# Patient Record
(demographics unavailable — no encounter records)

---
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
## 2024-11-13 NOTE — PHYSICAL EXAM
[General Appearance - Alert] : alert [General Appearance - In No Acute Distress] : in no acute distress [Clean] : clean [Dry] : dry [Healing Well] : healing well [Oriented To Time, Place, And Person] : oriented to person, place, and time [Impaired Insight] : insight and judgment were intact [Affect] : the affect was normal [Over the Past 2 Weeks, Have You Felt Down, Depressed, or Hopeless?] : 1.) Over the past 2 weeks, have you felt down, depressed, or hopeless? No [Over the Past 2 Weeks, Have You Felt Little Interest or Pleasure Doing Things?] : 2.) Over the past 2 weeks, have you felt little interest or pleasure doing things? No [FreeTextEntry1] : -depression screening/MDD completed; PH9 score  < 5, negative MDD [Person] : oriented to person [Place] : oriented to place [Time] : oriented to time [Short Term Intact] : short term memory intact [Remote Intact] : remote memory intact [Span Intact] : the attention span was normal [Concentration Intact] : normal concentrating ability [Fluency] : fluency intact [Comprehension] : comprehension intact [Current Events] : adequate knowledge of current events [Past History] : adequate knowledge of personal past history [Vocabulary] : adequate range of vocabulary [Cranial Nerves Optic (II)] : visual acuity intact bilaterally,  pupils equal round and reactive to light [Cranial Nerves Oculomotor (III)] : extraocular motion intact [Cranial Nerves Trigeminal (V)] : facial sensation intact symmetrically [Cranial Nerves Facial (VII)] : face symmetrical [Cranial Nerves Vestibulocochlear (VIII)] : hearing was intact bilaterally [Cranial Nerves Glossopharyngeal (IX)] : tongue and palate midline [Cranial Nerves Accessory (XI - Cranial And Spinal)] : head turning and shoulder shrug symmetric [Cranial Nerves Hypoglossal (XII)] : there was no tongue deviation with protrusion [Motor Tone] : muscle tone was normal in all four extremities [Motor Strength] : muscle strength was normal in all four extremities [No Muscle Atrophy] : normal bulk in all four extremities [Sensation Tactile Decrease] : light touch was intact [Sclera] : the sclera and conjunctiva were normal [PERRL With Normal Accommodation] : pupils were equal in size, round, reactive to light, with normal accommodation [Extraocular Movements] : extraocular movements were intact

## 2024-11-30 NOTE — REASON FOR VISIT
[de-identified] : L cranioplasty [de-identified] : 10/28/24 [de-identified] : 29 [de-identified] : clear PMMA
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
## 2024-11-30 NOTE — REASON FOR VISIT
[de-identified] : L cranioplasty [de-identified] : 10/28/24 [de-identified] : 29 [de-identified] : clear PMMA
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
## 2024-11-30 NOTE — DATA REVIEWED
[de-identified] :  ACC: [id number] EXAM: CT BRAIN ORDERED BY: RASHIDA CEBALLOS  PROCEDURE DATE: 08/13/2024    INTERPRETATION: CLINICAL INFORMATION: monitor SDH  COMPARISON: 7/31/2024  TECHNIQUE: Serial axial images were obtained from the skull base to the vertex using multi-slice helical technique. Sagittal and coronal reformats were obtained.  FINDINGS:  VENTRICLES AND SULCI: There is now ex vacuo dilatation of the left lateral ventricle when compared with the prior. INTRA-AXIAL: Left temporal lucency with minimal residual associated high attenuation decreased in conspicuity compared with the prior EXTRA-AXIAL: Extra-axial subdural hematoma with some acute hemorrhage as well as more chronic appearing hemorrhage/hygroma in the left subdural region at the site of the craniectomy. VISUALIZED SINUSES: Better a ration of the left sphenoid sinus with resolution of sphenoid sinus opacification. Mild right sphenoid sinus mucosal disease TYMPANOMASTOID CAVITIES: Clear. VISUALIZED ORBITS: Bilateral cataract surgery CALVARIUM: Evidence of left hemicraniectomy. Decreased soft tissue swelling extradural at the site of the craniectomy compared with the prior.  MISCELLANEOUS: None.   IMPRESSION: Status post left hemicraniectomy. Residual subdural hemorrhage and fluid at the site of the craniectomy relatively stable in appearance compared with the prior. Decreased extradural soft tissue swelling compared with the prior at the site of the craniectomy. Left temporal lucency with associated high attenuation decreased in conspicuity compared with the prior.
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
## 2024-11-30 NOTE — REASON FOR VISIT
[de-identified] : L cranioplasty [de-identified] : 10/28/24 [de-identified] : 29 [de-identified] : clear PMMA
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
## 2024-11-30 NOTE — DATA REVIEWED
[de-identified] :  ACC: [id number] EXAM: CT BRAIN ORDERED BY: RASHIDA CEBALLOS  PROCEDURE DATE: 08/13/2024    INTERPRETATION: CLINICAL INFORMATION: monitor SDH  COMPARISON: 7/31/2024  TECHNIQUE: Serial axial images were obtained from the skull base to the vertex using multi-slice helical technique. Sagittal and coronal reformats were obtained.  FINDINGS:  VENTRICLES AND SULCI: There is now ex vacuo dilatation of the left lateral ventricle when compared with the prior. INTRA-AXIAL: Left temporal lucency with minimal residual associated high attenuation decreased in conspicuity compared with the prior EXTRA-AXIAL: Extra-axial subdural hematoma with some acute hemorrhage as well as more chronic appearing hemorrhage/hygroma in the left subdural region at the site of the craniectomy. VISUALIZED SINUSES: Better a ration of the left sphenoid sinus with resolution of sphenoid sinus opacification. Mild right sphenoid sinus mucosal disease TYMPANOMASTOID CAVITIES: Clear. VISUALIZED ORBITS: Bilateral cataract surgery CALVARIUM: Evidence of left hemicraniectomy. Decreased soft tissue swelling extradural at the site of the craniectomy compared with the prior.  MISCELLANEOUS: None.   IMPRESSION: Status post left hemicraniectomy. Residual subdural hemorrhage and fluid at the site of the craniectomy relatively stable in appearance compared with the prior. Decreased extradural soft tissue swelling compared with the prior at the site of the craniectomy. Left temporal lucency with associated high attenuation decreased in conspicuity compared with the prior.
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
## 2024-11-30 NOTE — DATA REVIEWED
[de-identified] :  ACC: [id number] EXAM: CT BRAIN ORDERED BY: RASHIDA CEBALLOS  PROCEDURE DATE: 08/13/2024    INTERPRETATION: CLINICAL INFORMATION: monitor SDH  COMPARISON: 7/31/2024  TECHNIQUE: Serial axial images were obtained from the skull base to the vertex using multi-slice helical technique. Sagittal and coronal reformats were obtained.  FINDINGS:  VENTRICLES AND SULCI: There is now ex vacuo dilatation of the left lateral ventricle when compared with the prior. INTRA-AXIAL: Left temporal lucency with minimal residual associated high attenuation decreased in conspicuity compared with the prior EXTRA-AXIAL: Extra-axial subdural hematoma with some acute hemorrhage as well as more chronic appearing hemorrhage/hygroma in the left subdural region at the site of the craniectomy. VISUALIZED SINUSES: Better a ration of the left sphenoid sinus with resolution of sphenoid sinus opacification. Mild right sphenoid sinus mucosal disease TYMPANOMASTOID CAVITIES: Clear. VISUALIZED ORBITS: Bilateral cataract surgery CALVARIUM: Evidence of left hemicraniectomy. Decreased soft tissue swelling extradural at the site of the craniectomy compared with the prior.  MISCELLANEOUS: None.   IMPRESSION: Status post left hemicraniectomy. Residual subdural hemorrhage and fluid at the site of the craniectomy relatively stable in appearance compared with the prior. Decreased extradural soft tissue swelling compared with the prior at the site of the craniectomy. Left temporal lucency with associated high attenuation decreased in conspicuity compared with the prior.

## 2024-11-30 NOTE — PHYSICAL EXAM
[General Appearance - Alert] : alert [General Appearance - In No Acute Distress] : in no acute distress [Clean] : clean [Dry] : dry [Healing Well] : healing well [Sutures Intact] : closed with intact sutures [No Drainage] : without drainage [Normal Skin] : normal [Oriented To Time, Place, And Person] : oriented to person, place, and time [Sclera] : the sclera and conjunctiva were normal [Outer Ear] : the ears and nose were normal in appearance [Neck Appearance] : the appearance of the neck was normal [] : no respiratory distress [Abnormal Walk] : normal gait [Skin Color & Pigmentation] : normal skin color and pigmentation [FreeTextEntry1] : L scalp

## 2025-01-13 NOTE — REASON FOR VISIT
[Initial Eval - Existing Diagnosis] : an initial evaluation of an existing diagnosis [Spouse] : spouse [Family Member] : family member

## 2025-01-15 NOTE — HISTORY OF PRESENT ILLNESS
[FreeTextEntry1] : 62-year-old man with left traumatic subdural hematoma and skull fracture in July 2024 s/p left hemicraniectomy (Dr. Valdivia) and later cranioplasty (Dr. Petit).  He presents today to discuss possibly tapering off his seizure medications as well as symptoms he has been experiencing his hospitalization.  Patient speaks primarily Latvian; daughter was translating throughout appointment per patient request.  They report that he has been taking Keppra 1g BID since his initial hospitalization but that to their knowledge he has never had any seizures (no clinical seizures and they were told EEG during initial hospitalization did not show seizures).  He would like to know if he can try to lower this as he is concerned he may be having side effects from it.  Specifically he has had tingling/electric-type pains in his head and read that Keppra can cause headaches.  Also sometimes feels dizzy upon standing.  Of note he takes metoprolol BID and losartan daily in AM for blood pressure.

## 2025-01-15 NOTE — ASSESSMENT
[FreeTextEntry1] : 62-year-old man with left traumatic subdural hematoma and skull fracture in July 2024 s/p left hemicraniectomy and cranioplasty.  He has had no clinical or electrographic seizures throughout his surgical course and remains on Keppra 1g BID and thus I would recommend starting to taper off.  Of note, I am not certain this will help with the electric-type head sensations (which may be neuropathic pain related to multiple surgeries) or the lightheadedness upon standing (which sounds more orthostatic).    -Reduce Keppra to 500  mg BID -RTC 6-8 weeks

## 2025-01-15 NOTE — PHYSICAL EXAM
Quick Note:    Patient notified of negative throat culture via [facility], message has been reviewed.    ______   [FreeTextEntry1] : General: no apparent distress Mental Status: awake and alert, speech fluent and prosodic with no paraphasic errors Cranial Nerves: tracks in all directions, face symmetric, no dysarthria Motor: no abnormal movements, no orbiting or pronator drift Coordination: no dysmetria on finger-nose-finger testing Gait: narrow base, normal stance and stride, no Romberg

## 2025-01-15 NOTE — DATA REVIEWED
[de-identified] : EEG 7/29/2024 EEG Impression / Clinical Correlate: Abnormal prolonged EEG study due to 1- Skull defect and Focal cerebral dysfunction or structural abnormality in the left hemisphere region. 2- Moderate diffuse cerebral dysfunction that is not specific in etiology [de-identified] : CT head 10/29/2024 independently reviewed, s/p L craniectomy and cranioplasty, very small (~2 mm residual fluid collection)

## 2025-01-15 NOTE — DATA REVIEWED
[de-identified] : EEG 7/29/2024 EEG Impression / Clinical Correlate: Abnormal prolonged EEG study due to 1- Skull defect and Focal cerebral dysfunction or structural abnormality in the left hemisphere region. 2- Moderate diffuse cerebral dysfunction that is not specific in etiology [de-identified] : CT head 10/29/2024 independently reviewed, s/p L craniectomy and cranioplasty, very small (~2 mm residual fluid collection)

## 2025-01-15 NOTE — HISTORY OF PRESENT ILLNESS
[FreeTextEntry1] : 62-year-old man with left traumatic subdural hematoma and skull fracture in July 2024 s/p left hemicraniectomy (Dr. Valdivia) and later cranioplasty (Dr. Petit).  He presents today to discuss possibly tapering off his seizure medications as well as symptoms he has been experiencing his hospitalization.  Patient speaks primarily Nepali; daughter was translating throughout appointment per patient request.  They report that he has been taking Keppra 1g BID since his initial hospitalization but that to their knowledge he has never had any seizures (no clinical seizures and they were told EEG during initial hospitalization did not show seizures).  He would like to know if he can try to lower this as he is concerned he may be having side effects from it.  Specifically he has had tingling/electric-type pains in his head and read that Keppra can cause headaches.  Also sometimes feels dizzy upon standing.  Of note he takes metoprolol BID and losartan daily in AM for blood pressure.

## 2025-02-25 NOTE — PHYSICAL EXAM
[FreeTextEntry1] : General: no apparent distress Mental Status: awake and alert, speaks Armenian with family and minimal English, speech appears fluent/prosodic Cranial Nerves: tracks in all directions, face symmetric, no dysarthria Motor: no abnormal movements, no orbiting or pronator drift Coordination: no dysmetria on finger-nose-finger testing Gait: narrow base, normal stance and stride, no Romberg normal...

## 2025-02-25 NOTE — ASSESSMENT
[FreeTextEntry1] : 62-year-old man with left traumatic subdural hematoma and skull fracture in July 2024 s/p left hemicraniectomy and cranioplasty, seizure-free but with persistent neurologic symptoms.  1) AED management  -AEDs no longer indicated as he is out of the acute post-operative period and never had clinical or electrographic seizures -Reduce Keppra to 250 mg BID x 2 weeks, then 250 mg daily, then stop  2) Tingling pain in head, worsening -Trial of gabapentin 100 mg QHS  3) Dizziness, fatigue -These are common sequelae of severe head injury and will hopefully improve over time

## 2025-02-25 NOTE — HISTORY OF PRESENT ILLNESS
[FreeTextEntry1] : 62-year-old man with left traumatic subdural hematoma and skull fracture in July 2024 s/p left hemicraniectomy (Dr. Valdivia) and later cranioplasty (Dr. Petit) presenting today for follow up of multiple neurologic complaints.  At last visit we decided to reduce Keppra dose from 1g BID to 500 mg BID.  He has remained seizure-free since lowering the dose.   He continues to complain of tingling/burning pain on the left side of his head, especially while lying in bed at night.  He also continues to feel dizzy, fatigued, and generally weak up standing.